# Patient Record
Sex: FEMALE | Race: OTHER | Employment: FULL TIME
[De-identification: names, ages, dates, MRNs, and addresses within clinical notes are randomized per-mention and may not be internally consistent; named-entity substitution may affect disease eponyms.]

---

## 2022-06-02 ENCOUNTER — TELEMEDICINE (OUTPATIENT)
Dept: PRIMARY CARE CLINIC | Facility: CLINIC | Age: 32
End: 2022-06-02
Payer: COMMERCIAL

## 2022-06-02 DIAGNOSIS — U07.1 COVID-19: Primary | ICD-10-CM

## 2022-06-02 PROCEDURE — 99213 OFFICE O/P EST LOW 20 MIN: CPT | Performed by: CLINIC/CENTER

## 2022-06-02 ASSESSMENT — PATIENT HEALTH QUESTIONNAIRE - PHQ9
1. LITTLE INTEREST OR PLEASURE IN DOING THINGS: 0
SUM OF ALL RESPONSES TO PHQ9 QUESTIONS 1 & 2: 0
SUM OF ALL RESPONSES TO PHQ QUESTIONS 1-9: 0
2. FEELING DOWN, DEPRESSED OR HOPELESS: 0

## 2022-06-02 NOTE — PROGRESS NOTES
Yessenia Collins (:  1990) is a established patient with virtual audio-tele visit with c/o of 24 hour onset of nasal congestion with postive pcr covid testing today. 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 She and friend traveled and she lives with  who has yet to test and friend yet to test.  She inquires of meds and vitamins to take. She has not hx of chronic illness. disease or hx of dvt. Counseling provided for care, paxlovid, contact exposure with direction for testing, ed care for ssx pneumonia or dvt/pe. She is talkative, pleasant in no acute distress. Assessment & Plan   Below is the assessment and plan developed based on review of pertinent history, physical exam, labs, studies, and medications. 1. COVID-19  -     nirmatrelvir/ritonavir (PAXLOVID) 20 x 150 MG & 10 x 100MG; Take 3 tablets (two 150 mg nirmatrelvir and one 100 mg ritonavir tablets) by mouth every 12 hours for 5 days. , Disp-30 tablet, R-0Normal  see counseling/hpi  and summary wrap up   Return if symptoms worsen or fail to improve. Subjective   HPI  Review of Systems   Reason unable to perform ROS: see hpi, no n/v. mild diarrhea 2-3 days ago           Objective   Patient-Reported Vitals  No data recorded     Physical Exam  Constitutional:       General: She is not in acute distress. Appearance: She is not ill-appearing, toxic-appearing or diaphoretic. HENT:      Head: Normocephalic and atraumatic. Pulmonary:      Effort: Pulmonary effort is normal.   Skin:     Coloration: Skin is not jaundiced. Neurological:      Mental Status: She is alert. Psychiatric:         Mood and Affect: Mood normal.         Behavior: Behavior normal.         Thought Content:  Thought content normal.         Judgment: Judgment normal.                  Humble Garnett, was evaluated through a synchronous

## 2022-06-02 NOTE — PATIENT INSTRUCTIONS
Patient Education   hydration with clear liquids/fluids  Ok to use vitamin c, vitamin d, zinc, tumeric   (best for prevention)  -ED care for severe shortness of breath, wheezing, blood in sputum/cough/chest pain  Unexplained leg swelling  -may use mucinex max every 8-12 hours as needed for cough, congestion  -may use tylenol sinus, advil sinus as needed for fever, chills, congestion  -use condoms additionally if sexually active during illness due to potential less effective birth controls pills while taking paxlovid. -isolation and face mask wearing to reduce spead of virus for 7-10 days    Learning How To Care for Someone Who Has COVID-19  Things to know  Most people who get COVID-19 will recover with time and home care. Here aresome things to know if you're caring for someone who's sick.  Treat the symptoms. Common symptoms include a fever, coughing, and feeling short of breath. Urge the person to get extra rest and drink plenty of fluids to replace fluids lostfrom fever. To reduce a fever, offer acetaminophen (Tylenol) or ibuprofen (Advil, Motrin). It may also help with muscle aches. Read and follow all instructions on thelabel.  Watch for signs that the illness is getting worse. The person may need medical care if they're getting sicker (for example, if it's hard to breathe). But call the doctor's office before you go. They cantell you what to do. Call 911 or emergency services if the person has any of these symptoms:  ? Severe trouble breathing or shortness of breath. ? Constant pain or pressure in their chest.  ? Confusion, or trouble thinking clearly. ? Pale, gray, or blue-colored skin or lips. Some people are more likely to get very sick and need medical care. Call the doctor as soon as symptoms start if the person you're caring for is over 72, smokes, or has a serious health problem, like asthma, heart disease, diabetes,or an immune system problem.  Protect yourself and others.    The virus spreads easily from person to person, so take extra care to avoidcatching or spreading the infection. ? Keep the sick person away from others as much as you can. - Have the person stay in one room. If you can, give them their own bathroom to use. - Have only one person take care of them. Keep other peopleand petsout of the sickroom. - Have the person wear a mask around other people. This includes when anyone is in the room with them or if they leave their room (for example, to go to the bathroom). - Don't share personal items. These include dishes, cups, towels, and bedding. ? Wash your hands often and well. Use soap and water, and scrub for at least 20 seconds. This is especially important after you've been around the sick person or touched things they've touched. If soap and water aren't handy, use an alcohol-based hand . ? Wear a mask when caring for someone who is sick. And wear a mask when you're around other people after you've cared for someone who's sick. ? Avoid touching your mouth, nose, and eyes. ? Take care with the person's laundry. It's okay to wash the sick person's laundry with yours. If you have them, wear disposable gloves when handling their dirty laundry, and wash your hands well after you touch it. Wash items in the warmest water allowed for the fabric type, and dry them completely. ? Clean high-touch items every day and anytime the sick person touches them. These include doorknobs, light switches, toilets, counters, and remote controls. Use a household disinfectant or a homemade bleach solution. (Follow the directions on the label.) If the sick person has their own room, have them disinfect it every day. ? Avoid having visitors. If you have to have visitors, everyone needs to wear a mask and stay at least 6 feet (2 meters) away from you. And keep the visit as short as possible.  To help protect family and friends, stay in touch with them only by phone or computer. ? If you haven't had COVID-19 in the past 3 months or aren't fully vaccinated and boosted, you need to quarantine. Where can you learn more? Go to https://Centerstone TechnologiespepicewLean Train.PayEase. org and sign in to your Veveo account. Enter U442 in the [facility] box to learn more about \"Learning How To Care for Someone Who Has COVID-19. \"     If you do not have an account, please click on the \"Sign Up Now\" link. Current as of: March 26, 2021               Content Version: 13.2  © 2323-3396 [employer]. Care instructions adapted under license by [facility]. If you have questions about a medical condition or this instruction, always ask your healthcare professional. [place] [address] any warranty or liability for your use of this information. Patient Education        10 Things to Do When You Have COVID-19      Talk to your doctor about treatment. They might have you take medicine to help prevent serious illness. Stay home. Don't go to school, work, or public areas. And don't use public transportation, ride-shares, or taxis unless you have no choice. Leave your home only if you need to get medical care. But call the doctor's office firstso they know you're coming. And wear a mask. Ask before leaving isolation. Follow your doctor's advice about when it is safe for you to leave isolation. Wear a mask when you are around other people. It can help stop the spread of the virus. Limit contact with people in your home. If possible, stay in a separate bedroom and use a separate bathroom. Cover your mouth and nose with a tissue when you cough or sneeze. Then throw the tissue in the trash right away. Wash your hands often, especially after you cough or sneeze. Use soap and water, and scrub for at least 20 seconds. If soap and wateraren't available, use an alcohol-based hand . Don't share personal household items.  These include bedding, towels, cups and glasses, and eating utensils. Clean and disinfect your home every day. Use household  or disinfectant wipes or sprays. If needed, take acetaminophen (Tylenol) or ibuprofen (Advil, Motrin) to relieve fever and body aches. Read and follow all instructions on the label. Current as of: March 26, 2021               Content Version: 13.2  © 2006-2022 [employer]. Care instructions adapted under license by [facility] ([facility]). If you have questions about a medical condition or this instruction, always ask your healthcare professional. [place] [address] any warranty or liability for your use of this information. Patient Education        9 Things To Do If You've Been Exposed to COVID-19    Stay home. If you've been exposed to the virus but don't have symptoms, you may need to stay in quarantine for 10 full days. But if you are fully vaccinated and boosted, or tested positive for the COVID virus in the last 90 days and haverecovered, you may not need to quarantine. Ask your doctor. Call your doctor. Call your doctor or other health professional as soon as you can to let them know that you've been exposed. They may have you take a medicine to keep youfrom getting seriously ill. Wear a mask when you are around other people for at least 10 days. And don't visit people at high risk for serious illness from COVID for atleast 10 days. Limit contact with people in your home. If possible, stay in a separate bedroom and use a separate bathroom. Avoid contact with pets and other animals. Cover your mouth and nose with a tissue when you cough or sneeze. Then throw it in the trash right away. Wash your hands often, especially after you cough or sneeze. Use soap and water, and scrub for at least 20 seconds. If soap and wateraren't available, use an alcohol-based hand . Don't share personal household items.  These include bedding, towels, cups and glasses, and eating utensils. Clean and disinfect your home every day. Use household  or disinfectant wipes or sprays. If you have questions about COVID-19 testing, ask your doctor or go to cdc.govto use the COVID-19 Viral Testing Tool. Current as of: March 26, 2021               Content Version: 13.2  © 2006-2022 [employer]. Care instructions adapted under license by [facility] ([facility]). If you have questions about a medical condition or this instruction, always ask your healthcare professional. [place] [address] any warranty or liability for your use of this information. Patient Education        Learning About Coronavirus [phone])  What is coronavirus (COVID-19)? COVID-19 is a disease caused by a type of coronavirus. This illness was firstfound in December 2019. It has since spread worldwide. Coronaviruses are a large group of viruses. They cause the common cold. They also cause more serious illnesses like [place] respiratory syndrome (MERS) and severe acute respiratory syndrome (SARS). COVID-19 is caused by a novelcoronavirus. That means it's a new type that has not been seen in people before. What are the symptoms? COVID-19 symptoms may include:   Fever.  Cough.  Trouble breathing.  Chills or repeated shaking with chills.  Muscle and body aches.  Headache.  Sore throat.  New loss of taste or smell.  Vomiting.  Diarrhea. In severe cases, COVID-19 can cause pneumonia and make it hard to breathewithout help from a machine. It can cause death. How is it diagnosed? COVID-19 is diagnosed with a viral test. This may also be called a PCR test or antigen test. It looks for evidence of the virus in your breathing passages orlungs (respiratory system). The test is most often done on a sample from the nose, throat, or lungs. It's sometimes done on a sample of saliva.  One way a sample is collected is byputting a outdoor areas as well as public indoor spaces if you:  ? Have certain health conditions. ? Live with someone who has a compromised immune system. ? Live with someone who is not fully vaccinated.  If you have been exposed to the virus and are not fully vaccinated and boosted:  ? Talk to your doctor as soon as you can. Your doctor might have you take medicine to help prevent serious illness. ? Get a COVID-19 test. You may need to be tested more than once. ? Stay home. Try to separate from other people where you live. Don't go to school, work, or public areas. ? Wear a mask around other people for a full 10 days. Avoid travel and stay away from people at high risk for serious illness. ? Watch for symptoms.  If you have been exposed and you are fully vaccinated and boosted:  ? Talk to your doctor as soon as you can. Your doctor may have you take medicine to help prevent serious illness. ? Get a COVID-19 test. You may need to be tested more than once. ? Wear a mask around other people for a full 10 days. Avoid travel and stay away from people at high risk for serious illness. ? Watch for symptoms. If you're sick or test positive for COVID-19:   Talk to your doctor as soon as you can. Your doctor may have you take medicine to help prevent serious illness.  Get a COVID-19 test unless you have already been tested. You may need to be tested more than once.  Stay home. Leave only if you need to get medical care.  Wear a mask whenever you're around other people for a full 10 days.  Avoid travel and stay away from people at high risk for serious illness.  Limit contact with pets and people in your home. If possible, stay in a separate bedroom and use a separate bathroom.  Clean and disinfect your home every day. Use household  and disinfectant wipes or sprays. Take special care to clean things that you touch with your hands.   If you have questions about COVID-19 testing, ask your doctor or go to cdc.govto use the COVID-19 Viral Testing Tool. How can you self-isolate when you have COVID-19? If you have COVID-19, there are things you can do to help avoid spreading thevirus to others.  Limit contact with people in your home. If possible, stay in a separate bedroom and use a separate bathroom.  Wear a mask when you are around other people.  If you have to leave home, avoid crowds and try to stay at least 6 feet away from other people.  Avoid contact with pets and other animals.  Cover your mouth and nose with a tissue when you cough or sneeze. Then throw it in the trash right away.  Wash your hands often, especially after you cough or sneeze. Use soap and water, and scrub for at least 20 seconds. If soap and water aren't available, use an alcohol-based hand .  Don't share personal household items. These include bedding, towels, cups and glasses, and eating utensils. [address] in the warmest water allowed for the fabric type, and dry it completely. It's okay to wash other people's laundry with yours.  Clean and disinfect your home. Use household  and disinfectant wipes or sprays. When should you call for help? Call 911 anytime you think you may need emergency care. For example, call if you have life-threatening symptoms, such as:     You have severe trouble breathing. (You can't talk at all.)      You have constant chest pain or pressure.      You are severely dizzy or lightheaded.      You are confused or can't think clearly.      You have pale, gray, or blue-colored skin or lips.      You pass out (lose consciousness) or are very hard to wake up.      You have loss of balance or trouble walking.      You have trouble seeing out of one or both eyes.      You have weakness or drooping on one side of the face.      You have weakness or numbness in an arm or a leg.      You have trouble speaking.      You have a severe headache.      You have a seizure. Call your doctor now or seek immediate medical care if:     You have moderate trouble breathing. (You can't speak a full sentence.)      You are coughing up blood.      You have signs of low blood pressure. These include feeling lightheaded; being too weak to stand; and having cold, pale, clammy skin. Watch closely for changes in your health, and be sure to contact your doctor if:     Your symptoms get worse.      You are not getting better as expected.      You have new or worse symptoms of anxiety, depression, nightmares, or flashbacks. Call before you go to the doctor's office. Follow their instructions. And wear a mask. Where can you learn more? Go to [email or web address]://[email or web address].Powerspan. org and sign in to your [employer] account. Enter C008 in the [employer] box to learn more about \"Learning About Coronavirus (COVID-19). \"     If you do not have an account, please click on the \"Sign Up Now\" link. Current as of: July 1, 2021               Content Version: 13.2  © 2006-2022 [employer]. Care instructions adapted under license by [facility] ([facility]). If you have questions about a medical condition or this instruction, always ask your healthcare professional. [place] [address] any warranty or liability for your use of this information. Patient Education        Coronavirus ([id number]): Care Instructions  Overview  The coronavirus disease (COVID-19) is caused by a virus. Symptoms may include a fever, a cough, and shortness of breath. It can spread through droplets from coughing and sneezing, breathing, and singing. The virus also can spread whenpeople are in close contact with someone who is infected. Most people have mild symptoms and can take care of themselves at home with medicine to reduce symptoms. Talk to your doctor. They might have you take medicine to help prevent serious illness.  If your symptoms get worse, you may need care in a hospital. Treatment may include medicines, plus breathingsupport such as oxygen therapy or a ventilator. It's important to not spread the virus to others. If you have COVID-19, wear a mask anytime you are around other people. Isolate yourself while you are sick. Leave your home only if you need to get medical care or testing. Follow-up care is a key part of your treatment and safety. Be sure to make and go to all appointments, and call your doctor if you are having problems. It's also a good idea to know your test results and keep alist of the medicines you take. How can you care for yourself at home?  Get extra rest. It can help you feel better.  Drink plenty of fluids. This helps replace fluids lost from fever. Fluids may also help ease a scratchy throat.  You can take acetaminophen (Tylenol) or ibuprofen (Advil, Motrin) to reduce a fever. It may also help with muscle and body aches. Read and follow all instructions on the label.  Use petroleum jelly on sore skin. This can help if the skin around your nose and lips becomes sore from rubbing a lot with tissues. If you use oxygen, use a water-based product instead of petroleum jelly.  Keep track of symptoms such as fever and shortness of breath. This can help you know if you need to call your doctor. It can also help you know when it's safe to be around other people.  In some cases, your doctor might suggest that you get a pulse oximeter. How can you self-isolate when you have COVID-19? If you have COVID-19, there are things you can do to help avoid spreading thevirus to others.  Limit contact with people in your home. If possible, stay in a separate bedroom and use a separate bathroom.  Wear a mask when you are around other people.  If you have to leave home, avoid crowds and try to stay at least 6 feet away from other people.  Avoid contact with pets and other animals.  Cover your mouth and nose with a tissue when you cough or sneeze.  Then throw it in the trash right away.  Wash your hands often, especially after you cough or sneeze. Use soap and water, and scrub for at least 20 seconds. If soap and water aren't available, use an alcohol-based hand .  Don't share personal household items. These include bedding, towels, cups and glasses, and eating utensils. [address] in the warmest water allowed for the fabric type, and dry it completely. It's okay to wash other people's laundry with yours.  Clean and disinfect your home. Use household  and disinfectant wipes or sprays. When can you end self-isolation for COVID-19? If you know or think that you have the virus, you will need to self-isolate. When you can be around other people you live with and leave home depends on if you have symptoms. Important: Day 0 is the day your symptoms started or the day you tested positive. Day 1 is the day after your symptoms first started or your test was positive. Isolation starts on Day0. If you have symptoms, you can end isolation at the end of Day 5 if you haven't had a fever for 24 hours while not taking medicines to lower the fever and your symptoms are getting better. If you tested positive but have NO symptoms, you can end isolation at the end of Day 5. But if you start to have symptoms,follow the steps above. Use Day 0 as your first day of symptoms. You may take a COVID-19 test at the end of Day 5. If it is positive, continue to isolate until the end of Day 10. This is especially important if you have aweakened immune system. When should you call for help? Call 911 anytime you think you may need emergency care. For example, call if you have life-threatening symptoms, such as:     You have severe trouble breathing.  (You can't talk at all.)      You have constant chest pain or pressure.      You are severely dizzy or lightheaded.      You are confused or can't think clearly.      You have pale, gray, or blue-colored skin or lips.      You pass out (lose consciousness) or are very hard to wake up.      You have loss of balance or trouble walking.      You have trouble seeing out of one or both eyes.      You have weakness or drooping on one side of the face.      You have weakness or numbness in an arm or a leg.      You have trouble speaking.      You have a severe headache.      You have a seizure. Call your doctor now or seek immediate medical care if:     You have moderate trouble breathing. (You can't speak a full sentence.)      You are coughing up blood.      You have signs of low blood pressure. These include feeling lightheaded; being too weak to stand; and having cold, pale, clammy skin. Watch closely for changes in your health, and be sure to contact your doctor if:     Your symptoms get worse.      You are not getting better as expected.      You have new or worse symptoms of anxiety, depression, nightmares, or flashbacks. Call before you go to the doctor's office. Follow their instructions. And wear a mask. Where can you learn more? Go to [email or web address]://[email or web address].Metagenomix. org and sign in to your [email or web address] account. Enter C007 in the [facility] box to learn more about \"Coronavirus (COVID-19): Care Instructions. \"     If you do not have an account, please click on the \"Sign Up Now\" link. Current as of: July 1, 2021               Content Version: 13.2  © 2006-2022 [employer]wise[employer]. Care instructions adapted under license by [facility] ([facility]). If you have questions about a medical condition or this instruction, always ask your healthcare professional. [place] [address] any warranty or liability for your use of this information. Patient Education        8 Common Questions About the COVID-19 Vaccine  Here are the answers to some questions and concerns that many people ask. Why should I get a COVID-19 vaccine? It will help protect you, protect others, and end the pandemic. Getting a vaccine will help you avoid catching COVID-19. (If you do catch it, your symptoms will most likely be less severe than if you hadn't gotten the vaccine.) Getting a vaccine also helps you protect the people around youpeoplewho could have serious problems if they catch the virus. Are COVID-19 vaccines safe? COVID-19 vaccines are safe and effective. They have been given to millions ofpeople. The risk of serious problems is very low. Could I get COVID-19 from a vaccine? No, you can't get COVID-19 from a vaccine. The vaccines don't contain theCOVID-19 virus, so they can't cause the disease. What are the side effects of COVID-19 vaccines? You might not have any side effects. If you do, they'll probably be a lot like the common side effects of other vaccinesa fever, fatigue, and soreness. (These are signs that your immune system is learning how to fight the virus. )The side effects don't last long, and they can be treated if they bother you. How many doses of a vaccine will I need? You may need 1 or 2 doses of a vaccine. And you might need \"booster\" doseslater to help you stay protected. Do I need a vaccine if I've already had COVID-19? Yes. If you've had COVID-19, you may still be able to catch it again. [place] vaccine will give you extra protection. Will I still need to wear a mask after I get a vaccine? Maybe. Even if you're fully vaccinated, you may need to wear a mask in indoor public spaces. Be sure to follow all instructions from your local healthauthorities and the CDC. Why not just get COVID-19 and skip a vaccine? The risk of serious problems from the virus is much higher than the risk of serious problems from a vaccine. COVID-19 is unpredictable. Even if you're young and healthy, you could get very sick, have long-term health problems, ordie. So it's much safer to get a vaccine than it is to catch COVID-19.    The COVID-19 vaccines are one of the best to help stop the pandemic. So getvaccinated. Current as of: March 26, 2021               Content Version: 13.2  © 2006-2022 Healthwise, Incorporated. Care instructions adapted under license by [facility] ([facility]). If you have questions about a medical condition or this instruction, always ask your healthcare professional. [place] [address] any warranty or liability for your use of this information.

## 2022-06-03 ENCOUNTER — TELEPHONE (OUTPATIENT)
Dept: PRIMARY CARE CLINIC | Facility: CLINIC | Age: 32
End: 2022-06-03

## 2022-06-03 NOTE — TELEPHONE ENCOUNTER
Patient called with concerns about the medication that you Rx Paxlovid for Covid19, she read online that you can have a rebound effect. Meaning once completed the medications you can get sick again. .Also, she is complaining of metallic taste.